# Patient Record
Sex: MALE | Race: BLACK OR AFRICAN AMERICAN | NOT HISPANIC OR LATINO | URBAN - METROPOLITAN AREA
[De-identification: names, ages, dates, MRNs, and addresses within clinical notes are randomized per-mention and may not be internally consistent; named-entity substitution may affect disease eponyms.]

---

## 2022-04-21 ENCOUNTER — OFFICE VISIT (OUTPATIENT)
Dept: URGENT CARE | Facility: MEDICAL CENTER | Age: 32
End: 2022-04-21
Payer: COMMERCIAL

## 2022-04-21 VITALS
BODY MASS INDEX: 24.33 KG/M2 | WEIGHT: 155 LBS | RESPIRATION RATE: 16 BRPM | HEIGHT: 67 IN | TEMPERATURE: 102.9 F | HEART RATE: 96 BPM | OXYGEN SATURATION: 98 %

## 2022-04-21 DIAGNOSIS — M54.50 ACUTE BILATERAL LOW BACK PAIN WITHOUT SCIATICA: ICD-10-CM

## 2022-04-21 DIAGNOSIS — B34.9 VIRAL INFECTION: ICD-10-CM

## 2022-04-21 DIAGNOSIS — R11.0 NAUSEA: Primary | ICD-10-CM

## 2022-04-21 DIAGNOSIS — R50.9 FEVER, UNSPECIFIED FEVER CAUSE: ICD-10-CM

## 2022-04-21 PROCEDURE — 99214 OFFICE O/P EST MOD 30 MIN: CPT

## 2022-04-21 RX ORDER — LIDOCAINE 50 MG/G
1 PATCH TOPICAL DAILY
Qty: 15 PATCH | Refills: 1 | Status: SHIPPED | OUTPATIENT
Start: 2022-04-21

## 2022-04-21 RX ORDER — ONDANSETRON 4 MG/1
4 TABLET, ORALLY DISINTEGRATING ORAL ONCE
Status: COMPLETED | OUTPATIENT
Start: 2022-04-21 | End: 2022-04-21

## 2022-04-21 RX ORDER — ACETAMINOPHEN 325 MG/1
650 TABLET ORAL ONCE
Status: COMPLETED | OUTPATIENT
Start: 2022-04-21 | End: 2022-04-21

## 2022-04-21 RX ADMIN — ACETAMINOPHEN 650 MG: 325 TABLET ORAL at 16:01

## 2022-04-21 RX ADMIN — ONDANSETRON 4 MG: 4 TABLET, ORALLY DISINTEGRATING ORAL at 16:02

## 2022-04-21 NOTE — PATIENT INSTRUCTIONS
Viral infection  - Take Vitamin D3 200IU daily, Vitamin C, and zinc  Rest and drink electrolyte rich fluids  Tylenol and ibuprofen as needed for fevers and aches following package dosing instructions  Chicken noodle soup  - Sore throat: Throat lozenges and/or salt water gurgles  - Congestion relief with mucinex 600mg 1 tablet every 12 hours  You can use afrin or flonase for nasal congestion as directed on their packaging  Warm or cool air mist humidifiers    - Nighttime cough relief with Mucinex DM or NyQuil   - Go to the ED if you have trouble breathing or shortness of breath at rest, chest pain or pressure that lasts longer than 5 minutes, become confused or hard to wake, your lips or face are blue, you have a fever of 104°F (40°C) or higher   - Follow up with Family doctor as needed, however your symptoms may persists for 2-3 weeks from onset  Fever   Alternate Tylenol and Motrin every 4 hours to help keep fever at day  Low back pain  Apply lidocaine patch to lower back 1 time a day as needed for lower back pain

## 2022-04-21 NOTE — PROGRESS NOTES
[facility]        NAME: Brody Vines is a 36 y o  male  : 1990    MRN: [id number]  DATE: 2022  TIME: 4:10 PM    Assessment and Plan   Nausea [R11 0]  1  Nausea  ondansetron (ZOFRAN-ODT) dispersible tablet 4 mg   2  Fever, unspecified fever cause  acetaminophen (TYLENOL) tablet 650 mg   3  Acute bilateral low back pain without sciatica  lidocaine (Lidoderm) 5 %   4  Viral infection       Fever of 102 in the office, so he was provided with acetaminophen 650 mg  He had a complaint of nausea in the office and was amenable to taking ondansetron 4 mg  Patient Instructions     Viral infection  - Take Vitamin D3 200IU daily, Vitamin C, and zinc  Rest and drink electrolyte rich fluids  Tylenol and ibuprofen as needed for fevers and aches following package dosing instructions  Chicken noodle soup  - Sore throat: Throat lozenges and/or salt water gurgles  - Congestion relief with mucinex 600mg 1 tablet every 12 hours  You can use afrin or flonase for nasal congestion as directed on their packaging  Warm or cool air mist humidifiers    - Nighttime cough relief with Mucinex DM or NyQuil   - Go to the ED if you have trouble breathing or shortness of breath at rest, chest pain or pressure that lasts longer than 5 minutes, become confused or hard to wake, your lips or face are blue, you have a fever of 104°F (40°C) or higher   - Follow up with Family doctor as needed, however your symptoms may persists for 2-3 weeks from onset  Fever   Alternate Tylenol and Motrin every 4 hours to help keep fever at day  Low back pain  Apply lidocaine patch to lower back 1 time a day as needed for lower back pain  Follow up with PCP in 3-5 days  Proceed to  ER if symptoms worsen  Chief Complaint     Chief Complaint   Patient presents with    Cold Like Symptoms     cough, fever, body aches x last night  is not flu or covid vaccinated  works in a group home            History of Present Illness Brody Church is a 36 y o  male who presents with complaint of generalized fatigue and body aches, fevers, nausea, low back pain, and a mild cough that all started last night  Patient states that he thinks he may be dehydrated, however he is able to tolerate liquids and food  He states that he just wants to lay in bed  He was a finger with wrong with him  He denies chills, night sweats, headache, ear pressure/pain, congestion, odynophagia, dysphagia, drooling, chest pain, shortness a breath, difficulty breathing, vomiting, diarrhea, abdominal pain, nor any pain elsewhere in his body  He denies discolored stools  He denied medical history or any drug use  Review of Systems   Review of Systems   Constitutional: Positive for fatigue and fever  Negative for chills and unexpected weight change  HENT: Negative for dental problem, ear pain, hearing loss, nosebleeds, rhinorrhea, sore throat and trouble swallowing  Eyes: Negative for photophobia and visual disturbance  Respiratory: Negative for cough, chest tightness, shortness of breath and wheezing  Cardiovascular: Negative for chest pain and palpitations  Gastrointestinal: Positive for nausea  Negative for abdominal pain, constipation, diarrhea and vomiting  Endocrine: Negative for cold intolerance and heat intolerance  Genitourinary: Negative for decreased urine volume, dysuria, frequency and urgency  Musculoskeletal: Positive for back pain (low back) and myalgias  Negative for arthralgias  Neurological: Negative for dizziness, seizures, syncope, weakness, light-headedness and headaches  Hematological: Does not bruise/bleed easily  Psychiatric/Behavioral: Negative for confusion  All other systems reviewed and are negative          Current Medications       Current Outpatient Medications:     lidocaine (Lidoderm) 5 %, Apply 1 patch topically daily Remove & Discard patch within 12 hours or as directed by MD, Disp: 15 patch, Rfl: 1  No current facility-administered medications for this visit  Current Allergies     Allergies as of 04/21/2022    (No Known Allergies)            The following portions of the patient's history were reviewed and updated as appropriate: allergies, current medications, past family history, past medical history, past social history, past surgical history and problem list      Past Medical History:   Diagnosis Date    No known problems        History reviewed  No pertinent surgical history  No family history on file  Medications have been verified  Objective   Pulse 96   Temp (!) 102 9 °F (39 4 °C)   Resp 16   Ht 5' 7" (1 702 m)   Wt 70 3 kg (155 lb)   SpO2 98%   BMI 24 28 kg/m²   No LMP for male patient  Physical Exam     Physical Exam  Vitals reviewed  Constitutional:       General: He is not in acute distress  Appearance: Normal appearance  He is normal weight  He is ill-appearing  He is not diaphoretic  Comments: Patient is alert and oriented to person, place, situation  He was found lying in the bed on his stomach, then rolled over and laid in bed for the duration of the visit, thus appearing ill  He looks older than his stated age as he had white hair in his beard and on his head  HENT:      Head: Normocephalic and atraumatic  Right Ear: Hearing, tympanic membrane, ear canal and external ear normal  No tenderness  There is no impacted cerumen  Tympanic membrane is not injected, perforated or erythematous  Left Ear: Hearing, tympanic membrane, ear canal and external ear normal  No tenderness  There is no impacted cerumen  Tympanic membrane is not injected, perforated or erythematous  Nose: Congestion present  No rhinorrhea  Mouth/Throat:      Lips: Pink  Mouth: Mucous membranes are moist       Pharynx: Oropharynx is clear  Uvula midline  Posterior oropharyngeal erythema present  No oropharyngeal exudate or uvula swelling        Tonsils: No tonsillar exudate or tonsillar abscesses  Eyes:      General:         Right eye: No discharge  Left eye: No discharge  Extraocular Movements: Extraocular movements intact  Conjunctiva/sclera: Conjunctivae normal       Pupils: Pupils are equal, round, and reactive to light  Cardiovascular:      Rate and Rhythm: Normal rate and regular rhythm  Heart sounds: Normal heart sounds  No murmur heard  No friction rub  No gallop  Pulmonary:      Effort: Pulmonary effort is normal       Breath sounds: Normal breath sounds  No wheezing, rhonchi or rales  Neurological:      Mental Status: He is alert

## 2022-04-21 NOTE — LETTER
April 21, 2022     Patient: Oral Custer Sandhoff   YOB: 1990   Date of Visit: 4/21/2022       To Whom it May Concern:    Oral Karyn Cortes was seen in my clinic on 4/21/2022  He can return to school when they are fever free for 24 hours without the use of fever reducing agents  If you have any questions or concerns, please don't hesitate to call           Sincerely,          Margot rBown PA-C        CC: No Recipients

## 2022-10-13 ENCOUNTER — APPOINTMENT (OUTPATIENT)
Dept: RADIOLOGY | Age: 32
End: 2022-10-13

## 2022-10-13 ENCOUNTER — APPOINTMENT (OUTPATIENT)
Dept: URGENT CARE | Age: 32
End: 2022-10-13

## 2022-10-13 DIAGNOSIS — Z02.1 PHYSICAL EXAM, PRE-EMPLOYMENT: ICD-10-CM

## 2022-10-13 PROCEDURE — 71045 X-RAY EXAM CHEST 1 VIEW: CPT
